# Patient Record
Sex: MALE | Race: WHITE | Employment: OTHER | URBAN - METROPOLITAN AREA
[De-identification: names, ages, dates, MRNs, and addresses within clinical notes are randomized per-mention and may not be internally consistent; named-entity substitution may affect disease eponyms.]

---

## 2019-04-19 ENCOUNTER — APPOINTMENT (OUTPATIENT)
Dept: GENERAL RADIOLOGY | Age: 59
End: 2019-04-19
Attending: PHYSICIAN ASSISTANT
Payer: SELF-PAY

## 2019-04-19 ENCOUNTER — HOSPITAL ENCOUNTER (EMERGENCY)
Age: 59
Discharge: HOME | End: 2019-04-19
Attending: EMERGENCY MEDICINE | Admitting: EMERGENCY MEDICINE
Payer: SELF-PAY

## 2019-04-19 VITALS
BODY MASS INDEX: 29.05 KG/M2 | WEIGHT: 180 LBS | SYSTOLIC BLOOD PRESSURE: 131 MMHG | HEART RATE: 55 BPM | RESPIRATION RATE: 17 BRPM | DIASTOLIC BLOOD PRESSURE: 87 MMHG | TEMPERATURE: 97.8 F | OXYGEN SATURATION: 96 %

## 2019-04-19 DIAGNOSIS — J18.9 COMMUNITY ACQUIRED PNEUMONIA OF RIGHT MIDDLE LOBE OF LUNG: Primary | ICD-10-CM

## 2019-04-19 DIAGNOSIS — E86.0 DEHYDRATION: ICD-10-CM

## 2019-04-19 LAB
ANION GAP BLD CALC-SCNC: 19 MMOL/L (ref 10–20)
BUN BLD-MCNC: 15 MG/DL (ref 7–18)
CA-I BLD-MCNC: 1.1 MMOL/L (ref 1.12–1.32)
CHLORIDE BLD-SCNC: 103 MMOL/L (ref 100–108)
CO2 BLD-SCNC: 25 MMOL/L (ref 19–24)
CREAT UR-MCNC: 1.4 MG/DL (ref 0.6–1.3)
GLUCOSE BLD STRIP.AUTO-MCNC: 123 MG/DL (ref 74–106)
HCT VFR BLD CALC: 49 % (ref 36–49)
HGB BLD-MCNC: 16.7 G/DL (ref 12–16)
POTASSIUM BLD-SCNC: 3.8 MMOL/L (ref 3.5–5.5)
SODIUM BLD-SCNC: 142 MMOL/L (ref 136–145)

## 2019-04-19 PROCEDURE — 80047 BASIC METABLC PNL IONIZED CA: CPT

## 2019-04-19 PROCEDURE — 71046 X-RAY EXAM CHEST 2 VIEWS: CPT

## 2019-04-19 PROCEDURE — 99284 EMERGENCY DEPT VISIT MOD MDM: CPT

## 2019-04-19 RX ORDER — AZITHROMYCIN 250 MG/1
TABLET, FILM COATED ORAL
Qty: 6 TAB | Refills: 0 | Status: SHIPPED | OUTPATIENT
Start: 2019-04-19 | End: 2019-04-24

## 2019-04-19 RX ORDER — BENZONATATE 100 MG/1
100 CAPSULE ORAL
Qty: 30 CAP | Refills: 0 | Status: SHIPPED | OUTPATIENT
Start: 2019-04-19 | End: 2019-04-26

## 2019-04-19 NOTE — DISCHARGE INSTRUCTIONS
Patient Education        Pneumonia: Care Instructions  Your Care Instructions    Pneumonia is an infection of the lungs. Most cases are caused by infections from bacteria or viruses. Pneumonia may be mild or very severe. If it is caused by bacteria, you will be treated with antibiotics. It may take a few weeks to a few months to recover fully from pneumonia, depending on how sick you were and whether your overall health is good. Follow-up care is a key part of your treatment and safety. Be sure to make and go to all appointments, and call your doctor if you are having problems. It's also a good idea to know your test results and keep a list of the medicines you take. How can you care for yourself at home? · Take your antibiotics exactly as directed. Do not stop taking the medicine just because you are feeling better. You need to take the full course of antibiotics. · Take your medicines exactly as prescribed. Call your doctor if you think you are having a problem with your medicine. · Get plenty of rest and sleep. You may feel weak and tired for a while, but your energy level will improve with time. · To prevent dehydration, drink plenty of fluids, enough so that your urine is light yellow or clear like water. Choose water and other caffeine-free clear liquids until you feel better. If you have kidney, heart, or liver disease and have to limit fluids, talk with your doctor before you increase the amount of fluids you drink. · Take care of your cough so you can rest. A cough that brings up mucus from your lungs is common with pneumonia. It is one way your body gets rid of the infection. But if coughing keeps you from resting or causes severe fatigue and chest-wall pain, talk to your doctor. He or she may suggest that you take a medicine to reduce the cough. · Use a vaporizer or humidifier to add moisture to your bedroom. Follow the directions for cleaning the machine.   · Do not smoke or allow others to smoke around you. Smoke will make your cough last longer. If you need help quitting, talk to your doctor about stop-smoking programs and medicines. These can increase your chances of quitting for good. · Take an over-the-counter pain medicine, such as acetaminophen (Tylenol), ibuprofen (Advil, Motrin), or naproxen (Aleve). Read and follow all instructions on the label. · Do not take two or more pain medicines at the same time unless the doctor told you to. Many pain medicines have acetaminophen, which is Tylenol. Too much acetaminophen (Tylenol) can be harmful. · If you were given a spirometer to measure how well your lungs are working, use it as instructed. This can help your doctor tell how your recovery is going. · To prevent pneumonia in the future, talk to your doctor about getting a flu vaccine (once a year) and a pneumococcal vaccine (one time only for most people). When should you call for help? Call 911 anytime you think you may need emergency care. For example, call if:    · You have severe trouble breathing.    Call your doctor now or seek immediate medical care if:    · You cough up dark brown or bloody mucus (sputum).     · You have new or worse trouble breathing.     · You are dizzy or lightheaded, or you feel like you may faint.    Watch closely for changes in your health, and be sure to contact your doctor if:    · You have a new or higher fever.     · You are coughing more deeply or more often.     · You are not getting better after 2 days (48 hours).     · You do not get better as expected. Where can you learn more? Go to http://anthony-karthik.info/. Enter [id number] in the search box to learn more about \"Pneumonia: Care Instructions. \"  Current as of: September 5, 2018  Content Version: 11.9  © 5295-5353 [employer]. Care instructions adapted under license by [employer] (which disclaims liability or warranty for this information).  If you have questions about a medical condition or this instruction, always ask your healthcare professional. [address] any warranty or liability for your use of this information. Patient Education        Oral Rehydration: Care Instructions  Your Care Instructions    Dehydration occurs when your body loses too much water. This can happen if you do not drink enough fluids or lose a lot of fluid due to diarrhea, vomiting, or sweating. Being dehydrated can cause health problems and can even be life-threatening. To replace lost fluids, you need to drink liquid that contains special chemicals called electrolytes. Electrolytes keep your body working well. Plain water does not have electrolytes. You also need to rest to prevent more fluid loss. Replacing water and electrolytes (oral rehydration) completely takes about 36 hours. But you should feel better within a few hours. Follow-up care is a key part of your treatment and safety. Be sure to make and go to all appointments, and call your doctor if you are having problems. It's also a good idea to know your test results and keep a list of the medicines you take. How can you care for yourself at home? · Take frequent sips of a drink such as Gatorade, Powerade, or other rehydration drinks that your doctor suggests. These replace both fluid and important chemicals (electrolytes) you need for balance in your blood. · Drink 2 quarts of cool liquid over 2 to 4 hours. You should have at least 10 glasses of liquid a day to replace lost fluid. If you have kidney, heart, or liver disease and have to limit fluids, talk with your doctor before you increase the amount of fluids you drink. · Make your own drink. Measure everything carefully. The drink may not work well or may even be harmful if the amounts are off. ? 1 quart water  ? ½ teaspoon salt  ? 6 teaspoons sugar  · Do not drink liquid with caffeine, such as coffee and osito. · Do not drink any alcohol.  It can make you dehydrated. · Drink plenty of fluids, enough so that your urine is light yellow or clear like water. If you have kidney, heart, or liver disease and have to limit fluids, talk with your doctor before you increase the amount of fluids you drink. When should you call for help? Call 911 anytime you think you may need emergency care. For example, call if:    · You have signs of severe dehydration, such as:  ? You are confused or unable to stay awake.  ? You passed out (lost consciousness).    Call your doctor now or seek immediate medical care if:    · You still have signs of dehydration. You have sunken eyes and a dry mouth, and you pass only a little dark urine.     · You are dizzy or lightheaded, or you feel like you may faint.     · You are not able to keep down fluids.    Watch closely for changes in your health, and be sure to contact your doctor if:    · You do not get better as expected. Where can you learn more? Go to http://anthony-karthik.info/. Enter I040 in the search box to learn more about \"Oral Rehydration: Care Instructions. \"  Current as of: September 23, 2018  Content Version: 11.9  © 1666-5300 [employer]. Care instructions adapted under license by [employer] (which disclaims liability or warranty for this information). If you have questions about a medical condition or this instruction, always ask your healthcare professional. [address] any warranty or liability for your use of this information.

## 2019-04-19 NOTE — ED TRIAGE NOTES
Pt ambulatory into triage for c/o generalized body aches, diarrhea (x4 episodes per day), and productive cough since last Friday. Pt has been taking sudafed and cough medicine without relief. Denies abd pain, denies N/V, denies urinary symptoms.

## 2019-04-20 NOTE — ED PROVIDER NOTES
[facility] EMERGENCY DEPT 
 
 
8:49 PM 
 
Date: 4/19/2019 Patient Name: Carolyne Logan History of Presenting Illness Chief Complaint Patient presents with  Generalized Body Aches  Diarrhea  Cough 63 y.o. male with past medical history of hypertension presents the ED for complaints of generalized body aches, diarrhea, productive cough for the past 4 days. Patient states his diarrhea is brown, nonbloody. States he is having about 4 episodes of diarrhea per day. Notes that his cough is productive of green sputum. Also notes having diffuse body aches and nasal congestion. States he is not having any abdominal pain or vomiting, able to keep down food and fluids. Denies any fever, chills, shortness of breath, chest pain, other symptoms at this time. Nursing notes regarding the HPI and triage nursing notes were reviewed. Prior medical records were reviewed. Current Outpatient Medications Medication Sig Dispense Refill  azithromycin (ZITHROMAX Z-MINA) 250 mg tablet Take 2 tabs today and 1 tab each day thereafter. 6 Tab 0  
 benzonatate (TESSALON PERLES) 100 mg capsule Take 1 Cap by mouth three (3) times daily as needed for Cough for up to 7 days. 30 Cap 0  
 sodium chloride (NASAL MIST) 0.9 % spra 1 Spray by Nasal route as needed (congestion). 126 mL 0  
 atenolol (TENORMIN) 25 mg tablet Take 1 Tab by mouth daily. 30 Tab 0 Past History Past Medical History: 
Past Medical History:  
Diagnosis Date  
 HTN (hypertension) Past Surgical History: 
History reviewed. No pertinent surgical history. Family History: 
History reviewed. No pertinent family history. Social History: 
Social History Tobacco Use  Smoking status: Current Every Day Smoker  Smokeless tobacco: Never Used Substance Use Topics  Alcohol use: Yes  Drug use: Never Allergies: 
No Known Allergies Patient's primary care provider (as noted in EPIC):  [facility], Not On File Review of Systems Constitutional:  Denies malaise, fever, chills. Head:  Denies injury. ENMT:  + nasal congestion. Cardiac:  Denies chest pain or palpitations. Respiratory: + productive cough. Denies wheezing, difficulty breathing, shortness of breath. GI/ABD: + diarrhea. Denies injury, pain, distention, nausea, vomiting. Neuro:  Denies headache, LOC, dizziness, neurologic symptoms/deficits/paresthesias. Skin: Denies injury, rash, itching or skin changes. All other systems negative as reviewed. Visit Vitals /87 (BP 1 Location: Right arm, BP Patient Position: At rest) Pulse (!) 55 Temp 97.8 °F (36.6 °C) Resp 17 Wt 81.6 kg (180 lb) SpO2 96% BMI 29.05 kg/m² PHYSICAL EXAM: 
 
CONSTITUTIONAL:  Alert, in no apparent distress;  well developed;  well nourished. HEAD:  Normocephalic, atraumatic. EYES:  EOMI. Non-icteric sclera. Normal conjunctiva. ENTM:  Nose: Nasal turbinates erythematous and edematous, no rhinorrhea. Throat: Mild erythema, without edema or exudate, mucous membranes moist.  Uvula midline, airway patent. NECK:  Supple RESPIRATORY:  Chest clear, equal breath sounds, good air movement. Without wheezes, rhonchi, rales. CARDIOVASCULAR:  Regular rate and rhythm. No murmurs, rubs, or gallops. UPPER EXT:  Normal inspection. NEURO:  Moves all four extremities, and grossly normal motor exam. Patient is awake and alert, and answers questions appropriatelly. SKIN:  No rashes;  Normal for age. PSYCH:  Alert and normal affect. DIFFERENTIAL DIAGNOSES/ MEDICAL DECISION MAKING: 
Cough etiologies include viral upper respiratory infection, acute bronchitis, influenza/ flu, pneumonia, new onset asthma, other etiologies versus a combination of the above (ex. uri on top of pneumonia). CXR: Possible developing RML infiltrate Recent Results (from the past 12 hour(s)) POC CHEM8  
 Collection Time: 04/19/19  7:41 PM  
Result Value Ref Range CO2, POC 25 (H) 19 - 24 MMOL/L Glucose,  (H) 74 - 106 MG/DL  
 BUN, POC 15 7 - 18 MG/DL Creatinine, POC 1.4 (H) 0.6 - 1.3 MG/DL  
 GFRAA, POC >60 >60 ml/min/1.73m2 GFRNA, POC 52 (L) >60 ml/min/1.73m2 Sodium,  136 - 145 MMOL/L Potassium, POC 3.8 3.5 - 5.5 MMOL/L Calcium, ionized (POC) 1.10 (L) 1.12 - 1.32 mmol/L Chloride,  100 - 108 MMOL/L Anion gap, POC 19 10 - 20 Hematocrit, POC 49 36 - 49 % Hemoglobin, POC 16.7 (H) 12 - 16 G/DL IMPRESSION AND MEDICAL DECISION MAKING: 
Based upon the patient's presentation with noted HPI and PE, along with the work up done in the emergency department, I believe that the patient is having a viral illness with pneumonia. His hematocrit is slightly increased, may be from dehydration. Patient given strict instructions on oral rehydration at home. Rx provided for azithromycin, Tessalon Perles, and nasal mist.  Patient may follow-up with primary care doctor. Diagnosis: 1. Community acquired pneumonia of right middle lobe of lung ([address] 2. Dehydration Disposition: Discharge Follow-up Information Follow up With Specialties Details Why Contact Info Aaron Montoya DO Family Practice, [facility] Practice In 3 days  [id number] [address] [id number] 
[phone] [facility] EMERGENCY DEPT Emergency Medicine  If symptoms worsen [address] 
[phone] Discharge Medication List as of 4/19/2019  7:58 PM  
  
START taking these medications Details  
azithromycin (ZITHROMAX Z-MINA) 250 mg tablet Take 2 tabs today and 1 tab each day thereafter., Print, Disp-6 Tab, R-0  
  
benzonatate (TESSALON PERLES) 100 mg capsule Take 1 Cap by mouth three (3) times daily as needed for Cough for up to 7 days. , Print, Disp-30 Cap, R-0  
  
 sodium chloride (NASAL MIST) 0.9 % spra 1 Spray by Nasal route as needed (congestion). , Print, Disp-126 mL, R-0  
  
  
CONTINUE these medications which have NOT CHANGED Details  
atenolol (TENORMIN) 25 mg tablet Take 1 Tab by mouth daily. , Print, Disp-30 Tab, R-0 FRANCISCA Najera

## 2019-04-20 NOTE — ED NOTES
Patient stated understanding of discharge instructions. Patient was ambulatory upon discharge. Patient received three prescriptions. ,Patient armband removed and shredded

## 2019-07-28 ENCOUNTER — APPOINTMENT (OUTPATIENT)
Dept: GENERAL RADIOLOGY | Age: 59
End: 2019-07-28
Attending: PHYSICIAN ASSISTANT
Payer: SELF-PAY

## 2019-07-28 ENCOUNTER — HOSPITAL ENCOUNTER (EMERGENCY)
Age: 59
Discharge: HOME | End: 2019-07-28
Attending: EMERGENCY MEDICINE | Admitting: EMERGENCY MEDICINE
Payer: SELF-PAY

## 2019-07-28 VITALS
BODY MASS INDEX: 28.93 KG/M2 | TEMPERATURE: 98 F | DIASTOLIC BLOOD PRESSURE: 95 MMHG | OXYGEN SATURATION: 99 % | RESPIRATION RATE: 12 BRPM | HEART RATE: 65 BPM | HEIGHT: 66 IN | SYSTOLIC BLOOD PRESSURE: 152 MMHG | WEIGHT: 180 LBS

## 2019-07-28 DIAGNOSIS — R60.0 EDEMA OF FOOT: ICD-10-CM

## 2019-07-28 DIAGNOSIS — M25.571 ACUTE RIGHT ANKLE PAIN: Primary | ICD-10-CM

## 2019-07-28 LAB — D DIMER PPP FEU-MCNC: 0.37 UG/ML(FEU)

## 2019-07-28 PROCEDURE — 77030013175 HC SHOE PSTOP DJOR -A

## 2019-07-28 PROCEDURE — 73630 X-RAY EXAM OF FOOT: CPT

## 2019-07-28 PROCEDURE — 99283 EMERGENCY DEPT VISIT LOW MDM: CPT

## 2019-07-28 PROCEDURE — 85379 FIBRIN DEGRADATION QUANT: CPT

## 2019-07-28 RX ORDER — HYDROCODONE BITARTRATE AND ACETAMINOPHEN 5; 325 MG/1; MG/1
1 TABLET ORAL
Qty: 8 TAB | Refills: 0 | Status: SHIPPED | OUTPATIENT
Start: 2019-07-28 | End: 2019-08-02

## 2019-07-28 RX ORDER — NAPROXEN 500 MG/1
500 TABLET ORAL 2 TIMES DAILY WITH MEALS
Qty: 20 TAB | Refills: 0 | Status: SHIPPED | OUTPATIENT
Start: 2019-07-28 | End: 2019-07-28

## 2019-07-28 RX ORDER — INDOMETHACIN 25 MG/1
50 CAPSULE ORAL 3 TIMES DAILY
Qty: 60 CAP | Refills: 0 | Status: SHIPPED | OUTPATIENT
Start: 2019-07-28

## 2019-07-28 NOTE — ED PROVIDER NOTES
EMERGENCY DEPARTMENT HISTORY AND PHYSICAL EXAM    6:04 PM      Date: 7/28/2019  Patient Name: Katie Carias    History of Presenting Illness     Chief Complaint   Patient presents with    Foot Pain       History Provided By: Patient    Additional History (Context): Katie Carias is a 63 y.o. male with hypertension who presents with c/o R foot/ankle pain and edema x 2 days. Denies fever/chills, discoloration of skin, injury or trauma, numbness or tingling, calf pain or swelling. Notes history of previous ankle fracture \"years ago\". Notes he works on his feet all day. Notes he has been taking over-the-counter medication with minimal relief. Denies hx of arthritis, gout, DVT/PE, recent surgery/travel. PCP: Unknown, Provider    Current Outpatient Medications   Medication Sig Dispense Refill    indomethacin (INDOCIN) 25 mg capsule Take 2 Caps by mouth three (3) times daily. With food 60 Cap 0    HYDROcodone-acetaminophen (NORCO) 5-325 mg per tablet Take 1 Tab by mouth every six (6) hours as needed for Pain for up to 5 days. Max Daily Amount: 4 Tabs. 8 Tab 0    sodium chloride (NASAL MIST) 0.9 % spra 1 Spray by Nasal route as needed (congestion). 126 mL 0    atenolol (TENORMIN) 25 mg tablet Take 1 Tab by mouth daily. 30 Tab 0       Past History     Past Medical History:  Past Medical History:   Diagnosis Date    HTN (hypertension)        Past Surgical History:  History reviewed. No pertinent surgical history. Family History:  History reviewed. No pertinent family history. Social History:  Social History     Tobacco Use    Smoking status: Current Every Day Smoker    Smokeless tobacco: Never Used   Substance Use Topics    Alcohol use: Yes    Drug use: Never       Allergies:  No Known Allergies      Review of Systems       Review of Systems   Constitutional: Negative for chills and fever. Respiratory: Negative for shortness of breath. Cardiovascular: Negative for chest pain. Gastrointestinal: Negative for abdominal pain, nausea and vomiting. Musculoskeletal: Positive for joint swelling and myalgias. Skin: Negative for rash. Neurological: Negative for weakness. All other systems reviewed and are negative. Physical Exam     Visit Vitals  BP (!) 152/95 (BP 1 Location: Right arm, BP Patient Position: Sitting)   Pulse 65   Temp 98 °F (36.7 °C)   Resp 12   Ht 5' 6\" (1.676 m)   Wt 81.6 kg (180 lb)   SpO2 99%   BMI 29.05 kg/m²         Physical Exam   Constitutional: He appears well-developed and well-nourished. No distress. HENT:   Head: Normocephalic and atraumatic. Neck: Normal range of motion. Neck supple. Cardiovascular: Normal rate, regular rhythm, normal heart sounds and intact distal pulses. Exam reveals no gallop and no friction rub. No murmur heard. Pulmonary/Chest: Effort normal and breath sounds normal. No respiratory distress. He has no wheezes. He has no rales. Musculoskeletal:        Right ankle: He exhibits decreased range of motion (pain with PROM of ankle joint) and swelling. Tenderness. Lateral malleolus tenderness found. No medial malleolus tenderness found. Achilles tendon normal.        Right foot: There is tenderness and swelling (mild diffuse edema of foot with minimal extension into ankle). There is normal range of motion, normal capillary refill, no crepitus and no deformity. No calf tenderness or edema, no erythema/ warmth, dorsalis pedis 2+    Neurological: He is alert. Skin: Skin is warm. No rash noted. He is not diaphoretic. Nursing note and vitals reviewed. Diagnostic Study Results     Labs -  No results found for this or any previous visit (from the past 12 hour(s)). Radiologic Studies -   XR FOOT RT MIN 3 V   Final Result   IMPRESSION:      Mild and diffuse right foot soft tissue swelling without evidence of fracture or   acute malalignment.             Medical Decision Making   I am the first provider for this patient. I reviewed the vital signs, available nursing notes, past medical history, past surgical history, family history and social history. Vital Signs-Reviewed the patient's vital signs. Records Reviewed: Nursing Notes and Old Medical Records (Time of Review: 6:04 PM)    ED Course: Progress Notes, Reevaluation, and Consults:  9:50PM:  Reviewed results with patient and family. Discussed need for close outpatient follow-up this week for reassessment. Discussed strict return precautions, including fever, increased swelling, calf pain/swelling, or any other medical concerns. Provider Notes (Medical Decision Making): 31-year-old male who presents due to right ankle pain/foot pain associated with edema. Afebrile, nontoxic-appearing, looks well. History of previous ankle fracture. No acute trauma to ankle. No calf edema or tenderness to palpation. X-ray without acute process. Extremity neurovascularly intact. No evidence of DVT, cellulitis, septic joint, fracture. Possible gout, although atypical initial presentation. Will provide ace wrap, post op shoe, crutches. Will refer to orthopedic for further assessment. Will d/c with NSAIDs and pain control. Stable for d/c with close outpatient follow-up. Diagnosis     Clinical Impression:   1. Acute right ankle pain    2. Edema of foot        Disposition: home     Follow-up Information     Follow up With Specialties Details Why [address] [facility] EMERGENCY DEPT Emergency Medicine  If symptoms worsen [address] [place] [id number]  [address] [facility]  Schedule an appointment as soon as possible for a visit  [address]  [address]  [phone]           Discharge Medication List as of 7/28/2019  7:50 PM      START taking these medications    Details   indomethacin (INDOCIN) 25 mg capsule Take 2 Caps by mouth three (3) times daily.  With food, Print, Disp-60 Cap, R-0 HYDROcodone-acetaminophen (NORCO) 5-325 mg per tablet Take 1 Tab by mouth every six (6) hours as needed for Pain for up to 5 days. Max Daily Amount: 4 Tabs., Print, Disp-8 Tab, R-0         CONTINUE these medications which have NOT CHANGED    Details   sodium chloride (NASAL MIST) 0.9 % spra 1 Spray by Nasal route as needed (congestion). , Print, Disp-126 mL, R-0      atenolol (TENORMIN) 25 mg tablet Take 1 Tab by mouth daily. , Print, Disp-30 Tab, R-0             Dictation disclaimer:  Please note that this dictation was completed with [employer], the computer voice recognition software. Quite often unanticipated grammatical, syntax, homophones, and other interpretive errors are inadvertently transcribed by the computer software. Please disregard these errors. Please excuse any errors that have escaped final proofreading.

## 2019-07-28 NOTE — DISCHARGE INSTRUCTIONS
Patient Education     Take medication as prescribed. Follow-up with the orthopedic physician within 2 days for reassessment. Bring the results from this visit with you for their review. Return to the ED immediately for any new, worsening, or persistent symptoms, including fever, redness, or any other medical concerns. Leg and Ankle Edema: Care Instructions  Your Care Instructions  Swelling in the legs, ankles, and feet is called edema. It is common after you sit or stand for a while. Long plane flights or car rides often cause swelling in the legs and feet. You may also have swelling if you have to stand for long periods of time at your job. Problems with the veins in the legs (varicose veins) and changes in hormones can also cause swelling. Sometimes the swelling in the ankles and feet is caused by a more serious problem, such as heart failure, infection, blood clots, or liver or kidney disease. Follow-up care is a key part of your treatment and safety. Be sure to make and go to all appointments, and call your doctor if you are having problems. It's also a good idea to know your test results and keep a list of the medicines you take. How can you care for yourself at home? · If your doctor gave you medicine, take it as prescribed. Call your doctor if you think you are having a problem with your medicine. · Whenever you are resting, raise your legs up. Try to keep the swollen area higher than the level of your heart. · Take breaks from standing or sitting in one position. ? Walk around to increase the blood flow in your lower legs. ? Move your feet and ankles often while you stand, or tighten and relax your leg muscles. · Wear support stockings. Put them on in the morning, before swelling gets worse. · Eat a balanced diet. Lose weight if you need to. · Limit the amount of salt (sodium) in your diet. Salt holds fluid in the body and may increase swelling. When should you call for help?   Call 911 anytime you think you may need emergency care. For example, call if:    · You have symptoms of a blood clot in your lung (called a pulmonary embolism). These may include:  ? Sudden chest pain. ? Trouble breathing. ? Coughing up blood.    Call your doctor now or seek immediate medical care if:    · You have signs of a blood clot, such as:  ? Pain in your calf, back of the knee, thigh, or groin. ? Redness and swelling in your leg or groin.     · You have symptoms of infection, such as:  ? Increased pain, swelling, warmth, or redness. ? Red streaks or pus. ? A fever.    Watch closely for changes in your health, and be sure to contact your doctor if:    · Your swelling is getting worse.     · You have new or worsening pain in your legs.     · You do not get better as expected. Where can you learn more? Go to http://anthony-karthik.info/. Enter A705 in the search box to learn more about \"Leg and Ankle Edema: Care Instructions. \"  Current as of: September 23, 2018  Content Version: 12.1  © 5882-3900 [employer]. Care instructions adapted under license by [employer] (which disclaims liability or warranty for this information). If you have questions about a medical condition or this instruction, always ask your healthcare professional. [place] [address] any warranty or liability for your use of this information.

## 2019-07-29 NOTE — ED NOTES
Ace wrap, post op shoes, and crutches completed by Jolyn Krabbe, EDT. I have reviewed discharge instructions with the patient. The patient verbalized understanding. Discharge medications reviewed with patient and appropriate educational materials and side effects teaching were provided. Patient in stable condition at discharge. Patient armband removed and given to patient to take home. Patient was informed of the privacy risks if armband lost or stolen. Patient signed paper discharge instructions.

## 2020-03-29 ENCOUNTER — HOSPITAL ENCOUNTER (EMERGENCY)
Age: 60
Discharge: HOME | End: 2020-03-29
Attending: EMERGENCY MEDICINE
Payer: SELF-PAY

## 2020-03-29 ENCOUNTER — APPOINTMENT (OUTPATIENT)
Dept: GENERAL RADIOLOGY | Age: 60
End: 2020-03-29
Attending: PHYSICIAN ASSISTANT
Payer: SELF-PAY

## 2020-03-29 VITALS
DIASTOLIC BLOOD PRESSURE: 88 MMHG | TEMPERATURE: 97.4 F | BODY MASS INDEX: 29.73 KG/M2 | OXYGEN SATURATION: 99 % | SYSTOLIC BLOOD PRESSURE: 144 MMHG | HEIGHT: 66 IN | WEIGHT: 185 LBS | RESPIRATION RATE: 14 BRPM | HEART RATE: 60 BPM

## 2020-03-29 DIAGNOSIS — S61.212A LACERATION OF RIGHT MIDDLE FINGER WITHOUT FOREIGN BODY WITHOUT DAMAGE TO NAIL, INITIAL ENCOUNTER: Primary | ICD-10-CM

## 2020-03-29 PROCEDURE — 73130 X-RAY EXAM OF HAND: CPT

## 2020-03-29 PROCEDURE — 75810000293 HC SIMP/SUPERF WND  RPR

## 2020-03-29 PROCEDURE — 99283 EMERGENCY DEPT VISIT LOW MDM: CPT

## 2020-03-29 PROCEDURE — 90715 TDAP VACCINE 7 YRS/> IM: CPT | Performed by: PHYSICIAN ASSISTANT

## 2020-03-29 PROCEDURE — 74011250637 HC RX REV CODE- 250/637: Performed by: PHYSICIAN ASSISTANT

## 2020-03-29 PROCEDURE — 74011000250 HC RX REV CODE- 250: Performed by: PHYSICIAN ASSISTANT

## 2020-03-29 PROCEDURE — 74011250636 HC RX REV CODE- 250/636: Performed by: PHYSICIAN ASSISTANT

## 2020-03-29 PROCEDURE — 90471 IMMUNIZATION ADMIN: CPT

## 2020-03-29 RX ORDER — ACETAMINOPHEN 500 MG
1000 TABLET ORAL
Status: COMPLETED | OUTPATIENT
Start: 2020-03-29 | End: 2020-03-29

## 2020-03-29 RX ORDER — BACITRACIN 500 UNIT/G
1 PACKET (EA) TOPICAL
Status: COMPLETED | OUTPATIENT
Start: 2020-03-29 | End: 2020-03-29

## 2020-03-29 RX ORDER — NAPROXEN 500 MG/1
500 TABLET ORAL 2 TIMES DAILY WITH MEALS
Qty: 20 TAB | Refills: 0 | Status: SHIPPED | OUTPATIENT
Start: 2020-03-29 | End: 2020-04-08

## 2020-03-29 RX ORDER — CEPHALEXIN 500 MG/1
500 CAPSULE ORAL 4 TIMES DAILY
Qty: 28 CAP | Refills: 0 | Status: SHIPPED | OUTPATIENT
Start: 2020-03-29 | End: 2020-04-05

## 2020-03-29 RX ORDER — IBUPROFEN 400 MG/1
800 TABLET ORAL
Status: COMPLETED | OUTPATIENT
Start: 2020-03-29 | End: 2020-03-29

## 2020-03-29 RX ORDER — ACETAMINOPHEN 325 MG/1
650 TABLET ORAL
Qty: 20 TAB | Refills: 0 | Status: SHIPPED | OUTPATIENT
Start: 2020-03-29

## 2020-03-29 RX ADMIN — IBUPROFEN 800 MG: 400 TABLET ORAL at 13:21

## 2020-03-29 RX ADMIN — BACITRACIN ZINC 1 PACKET: 500 OINTMENT TOPICAL at 15:19

## 2020-03-29 RX ADMIN — ACETAMINOPHEN 1000 MG: 500 TABLET, FILM COATED ORAL at 13:21

## 2020-03-29 RX ADMIN — TETANUS TOXOID, REDUCED DIPHTHERIA TOXOID AND ACELLULAR PERTUSSIS VACCINE, ADSORBED 0.5 ML: 5; 2.5; 8; 8; 2.5 SUSPENSION INTRAMUSCULAR at 13:21

## 2020-03-29 NOTE — DISCHARGE INSTRUCTIONS
Patient Education        Cuts: Care Instructions  Your Care Instructions  A cut can happen anywhere on your body. Stitches, staples, skin adhesives, or pieces of tape called Steri-Strips are sometimes used to keep the edges of a cut together and help it heal. Steri-Strips can be used by themselves or with stitches or staples. Sometimes cuts are left open. If the cut went deep and through the skin, the doctor may have closed the cut in two layers. A deeper layer of stitches brings the deep part of the cut together. These stitches will dissolve and don't need to be removed. The upper layer closure, which could be stitches, staples, Steri-Strips, or adhesive, is what you see on the cut. A cut is often covered by a bandage. The doctor has checked you carefully, but problems can develop later. If you notice any problems or new symptoms, get medical treatment right away. Follow-up care is a key part of your treatment and safety. Be sure to make and go to all appointments, and call your doctor if you are having problems. It's also a good idea to know your test results and keep a list of the medicines you take. How can you care for yourself at home? If a cut is open or closed  · Prop up the sore area on a pillow anytime you sit or lie down during the next 3 days. Try to keep it above the level of your heart. This will help reduce swelling. · Keep the cut dry for the first 24 to 48 hours. After this, you can shower if your doctor okays it. Pat the cut dry. · Don't soak the cut, such as in a bathtub. Your doctor will tell you when it's safe to get the cut wet. · After the first 24 to 48 hours, clean the cut with soap and water 2 times a day unless your doctor gives you different instructions. ? Don't use hydrogen peroxide or alcohol, which can slow healing. ? You may cover the cut with a thin layer of petroleum jelly and a nonstick bandage.   ? If the doctor put a bandage over the cut, put on a new bandage after cleaning the cut or if the bandage gets wet or dirty. · Avoid any activity that could cause your cut to reopen. · Be safe with medicines. Read and follow all instructions on the label. ? If the doctor gave you a prescription medicine for pain, take it as prescribed. ? If you are not taking a prescription pain medicine, ask your doctor if you can take an over-the-counter medicine. If the cut is closed with stitches, staples, or Steri-Strips  · Follow the above instructions for open or closed cuts. · Do not remove the stitches or staples on your own. Your doctor will tell you when to come back to have the stitches or staples removed. · Leave Steri-Strips on until they fall off. If the cut is closed with a skin adhesive  · Follow the above instructions for open or closed cuts. · Leave the skin adhesive on your skin until it falls off on its own. This may take 5 to 10 days. · Do not scratch, rub, or pick at the adhesive. · Do not put the sticky part of a bandage directly on the adhesive. · Do not put any kind of ointment, cream, or lotion over the area. This can make the adhesive fall off too soon. Do not use hydrogen peroxide or alcohol, which can slow healing. When should you call for help? Call your doctor now or seek immediate medical care if:    · You have new pain, or your pain gets worse.     · The skin near the cut is cold or pale or changes color.     · You have tingling, weakness, or numbness near the cut.     · The cut starts to bleed, and blood soaks through the bandage. Oozing small amounts of blood is normal.     · You have trouble moving the area near the cut.     · You have symptoms of infection, such as:  ? Increased pain, swelling, warmth, or redness around the cut.  ? Red streaks leading from the cut.  ? Pus draining from the cut.  ? A fever.    Watch closely for changes in your health, and be sure to contact your doctor if:    · The cut reopens.     · You do not get better as expected. Where can you learn more? Go to http://anthony-karthik.info/  Enter M735 in the search box to learn more about \"Cuts: Care Instructions. \"  Current as of: June 26, 2019Content Version: 12.4  © 8873-7305 Healthwise, Incorporated. Care instructions adapted under license by [employer] (which disclaims liability or warranty for this information). If you have questions about a medical condition or this instruction, always ask your healthcare professional. [place] [address] any warranty or liability for your use of this information. Patient Education        Cuts on the Hand Closed With Stitches: Care Instructions  Your Care Instructions    A cut on your hand can be on your fingers, your thumb, or the front or back of your hand. Sometimes a cut can injure the tendons, blood vessels, or nerves of your hand. The doctor used stitches to close the cut. Using stitches also helps the cut heal and reduces scarring. The doctor may have given you a splint to help prevent you from moving your hand, fingers, or thumb. If the cut went deep and through the skin, the doctor put in two layers of stitches. The deeper layer brings the deep part of the cut together. These stitches will dissolve and don't need to be removed. The stitches in the upper layer are the ones you see on the cut. You will probably have a bandage. You will need to have the stitches removed, usually in 7 to 14 days. The doctor may suggest that you see a hand specialist if the cut is very deep or if you have trouble moving your fingers or have less feeling in your hand. The doctor has checked you carefully, but problems can develop later. If you notice any problems or new symptoms, get medical treatment right away. Follow-up care is a key part of your treatment and safety. Be sure to make and go to all appointments, and call your doctor if you are having problems.  It's also a good idea to know your test results and keep a list of the medicines you take. How can you care for yourself at home? · Keep the cut dry for the first 24 to 48 hours. After this, you can shower if your doctor okays it. Pat the cut dry. · Don't soak the cut, such as in a bathtub. Your doctor will tell you when it's safe to get the cut wet. · If your doctor told you how to care for your cut, follow your doctor's instructions. If you did not get instructions, follow this general advice:  ? After the first 24 to 48 hours, wash around the cut with clean water 2 times a day. Don't use hydrogen peroxide or alcohol, which can slow healing. ? You may cover the cut with a thin layer of petroleum jelly, such as Vaseline, and a nonstick bandage. ? Apply more petroleum jelly and replace the bandage as needed. · Prop up the sore hand on a pillow anytime you sit or lie down during the next 3 days. Try to keep it above the level of your heart. This will help reduce swelling. · Avoid any activity that could cause your cut to reopen. · Do not remove the stitches on your own. Your doctor will tell you when to come back to have the stitches removed. · Be safe with medicines. Take pain medicines exactly as directed. ? If the doctor gave you a prescription medicine for pain, take it as prescribed. ? If you are not taking a prescription pain medicine, ask your doctor if you can take an over-the-counter medicine. When should you call for help? Call your doctor now or seek immediate medical care if:    · You have new pain, or your pain gets worse.     · The skin near the cut is cold or pale or changes color.     · You have tingling, weakness, or numbness near the cut.     · The cut starts to bleed, and blood soaks through the bandage. Oozing small amounts of blood is normal.     · You have trouble moving the area of the hand near the cut.     · You have symptoms of infection, such as:  ? Increased pain, swelling, warmth, or redness around the cut.  ?  Red streaks leading from the cut.  ? Pus draining from the cut.  ? A fever.    Watch closely for changes in your health, and be sure to contact your doctor if:    · You do not get better as expected. Where can you learn more? Go to http://anthony-karthik.info/  Enter T250 in the search box to learn more about \"Cuts on the Hand Closed With Stitches: Care Instructions. \"  Current as of: June 26, 2019Content Version: 12.4  © 7720-4387 Healthwise, Incorporated. Care instructions adapted under license by [employer] (which disclaims liability or warranty for this information). If you have questions about a medical condition or this instruction, always ask your healthcare professional. [address] any warranty or liability for your use of this information.

## 2020-03-29 NOTE — ED PROVIDER NOTES
EMERGENCY DEPARTMENT HISTORY AND PHYSICAL EXAM    12:42 PM      Date: 3/29/2020  Patient Name: Kristy Rodriguez    History of Presenting Illness     Chief Complaint   Patient presents with    Laceration         History Provided By: Patient    Additional History (Context): Kristy Rodriguez is a 63 y.o. male with hypertension who presents with complaints of a laceration to his left middle finger after getting it caught in a saw. States he is unsure of his tetanus status. PCP: Other, MD Rick    Current Outpatient Medications   Medication Sig Dispense Refill    cephALEXin (Keflex) 500 mg capsule Take 1 Cap by mouth four (4) times daily for 7 days. 28 Cap 0    naproxen (Naprosyn) 500 mg tablet Take 1 Tab by mouth two (2) times daily (with meals) for 10 days. 20 Tab 0    acetaminophen (TYLENOL) 325 mg tablet Take 2 Tabs by mouth every six (6) hours as needed for Pain. 20 Tab 0    indomethacin (INDOCIN) 25 mg capsule Take 2 Caps by mouth three (3) times daily. With food 60 Cap 0    sodium chloride (NASAL MIST) 0.9 % spra 1 Spray by Nasal route as needed (congestion). 126 mL 0    atenolol (TENORMIN) 25 mg tablet Take 1 Tab by mouth daily. 30 Tab 0       Past History     Past Medical History:  Past Medical History:   Diagnosis Date    HTN (hypertension)        Past Surgical History:  History reviewed. No pertinent surgical history. Family History:  History reviewed. No pertinent family history. Social History:  Social History     Tobacco Use    Smoking status: Current Every Day Smoker    Smokeless tobacco: Never Used   Substance Use Topics    Alcohol use: Yes    Drug use: Never       Allergies:  No Known Allergies      Review of Systems       Review of Systems   Constitutional: Negative. HENT: Negative. Respiratory: Negative. Cardiovascular: Negative. Gastrointestinal: Negative. Genitourinary: Negative. Musculoskeletal: Positive for arthralgias. Skin: Positive for wound. Neurological: Negative. All other systems reviewed and are negative. Physical Exam     Visit Vitals  /88 (BP 1 Location: Right arm)   Pulse 60   Temp 97.4 °F (36.3 °C)   Resp 14   Ht 5' 6\" (1.676 m)   Wt 83.9 kg (185 lb)   SpO2 99%   BMI 29.86 kg/m²         Physical Exam  Vitals signs reviewed. Constitutional:       General: He is not in acute distress. Appearance: Normal appearance. He is not ill-appearing, toxic-appearing or diaphoretic. HENT:      Head: Normocephalic and atraumatic. Right Ear: External ear normal.      Left Ear: External ear normal.      Nose: Nose normal.      Mouth/Throat:      Mouth: Mucous membranes are moist.      Pharynx: Oropharynx is clear. Eyes:      Extraocular Movements: Extraocular movements intact. Neck:      Musculoskeletal: Neck supple. Cardiovascular:      Rate and Rhythm: Normal rate and regular rhythm. Pulses: Normal pulses. Heart sounds: No murmur. No gallop. Pulmonary:      Effort: Pulmonary effort is normal.      Breath sounds: No wheezing, rhonchi or rales. Skin:     General: Skin is warm and dry. Capillary Refill: Capillary refill takes less than 2 seconds. Comments: Stellate wound to distal aspect of right long finger, measuring approximately 4.5 cm in total   Neurological:      General: No focal deficit present. Mental Status: He is alert and oriented to person, place, and time. Cranial Nerves: No cranial nerve deficit. Sensory: No sensory deficit. Motor: No weakness. Diagnostic Study Results     Labs -  No results found for this or any previous visit (from the past 12 hour(s)). Radiologic Studies -   XR HAND LT MIN 3 V   Final Result   IMPRESSION:      Distal left third digit laceration but negative for acute osseous normality or   radiopaque foreign body in the left hand. Medical Decision Making   I am the first provider for this patient.     I reviewed the vital signs, available nursing notes, past medical history, past surgical history, family history and social history. Vital Signs-Reviewed the patient's vital signs. Records Reviewed: Nursing Notes and Old Medical Records (Time of Review: 12:42 PM)    ED Course: Progress Notes, Reevaluation, and Consults:  12:42 PM  Met with patient, reviewed history, performed physical exam. Will check radiographs of the left hand to evaluate for fractures. Will clean wound and update patient's tetanus. Procedures:  Wound Repair  Date/Time: 3/29/2020 3:16 PM  Performed by: PAPreparation: sterile field established  Location details: right long finger  Wound length:2.6 - 7.5 cm (Two lacerations: Distal, ventral aspect of long finger measuring approx 1cm; medial aspect of long finger, approx 2.7cm)  Anesthesia: digital block    Anesthesia:  Local Anesthetic: lidocaine 1% without epinephrine  Anesthetic total: 4 mL  Foreign bodies: no foreign bodies  Irrigation solution: saline  Irrigation method: syringe  Debridement: minimal  Skin closure: 4-0 nylon  Number of sutures: 12 (Distal 1cm laceration 4 sutures, medial 2.7cm laceration 8 sutures.)  Technique: simple and interrupted  Approximation: close  Dressing: antibiotic ointment  Patient tolerance: Patient tolerated the procedure well with no immediate complications  My total time at bedside, performing this procedure was 31-45 minutes. Provider Notes (Medical Decision Making):  63year old male seen in the ED for complaints of laceration to the right long finger. Physical exam as noted above. Radiographs negative for acute fracture. Tetanus updated prior to discharge. Patient's wound closed as outlined above. He will be placed on oral antibiotics. He was advised to follow up with Dr Favian Huff, orthopedic surgeon, for reassessment. Patient advised to follow up with PCP as soon as possible.  Patient advised to return to the ED in 5-7 days for suture removal. Patient was advised to return to the ED immediately if symptoms worsen, or as needed. Diagnosis     Clinical Impression:   1. Laceration of right middle finger without foreign body without damage to nail, initial encounter        Disposition: home     Follow-up Information     Follow up With Specialties Details Why [address]  Call in 1 day For follow up regarding ER visit. [address] [facility] 800 [facility] Call in 1 day For follow up regarding ER visit. [address] [facility]    Alana Shaw MD Orthopedic Surgery, Orthopedic Surgery, Orthopedic Surgery Call in 1 day For follow up regarding ER visit. [place] [address]  [address] 150 [facility] EMERGENCY DEPT Emergency Medicine In 1 week For suture removal. [address] [facility] 51    [facility] EMERGENCY DEPT Emergency Medicine  As needed, Immediately if symptoms worsen. [address]  [phone]           Discharge Medication List as of 3/29/2020  3:19 PM      START taking these medications    Details   cephALEXin (Keflex) 500 mg capsule Take 1 Cap by mouth four (4) times daily for 7 days. , Print, Disp-28 Cap, R-0      naproxen (Naprosyn) 500 mg tablet Take 1 Tab by mouth two (2) times daily (with meals) for 10 days. , Print, Disp-20 Tab, R-0      acetaminophen (TYLENOL) 325 mg tablet Take 2 Tabs by mouth every six (6) hours as needed for Pain., Print, Disp-20 Tab, R-0         CONTINUE these medications which have NOT CHANGED    Details   indomethacin (INDOCIN) 25 mg capsule Take 2 Caps by mouth three (3) times daily. With food, Print, Disp-60 Cap, R-0      sodium chloride (NASAL MIST) 0.9 % spra 1 Spray by Nasal route as needed (congestion). , Print, Disp-126 mL, R-0      atenolol (TENORMIN) 25 mg tablet Take 1 Tab by mouth daily. , Print, Disp-30 Tab, R-0 Jorgito Herbert PA-C    Dictation disclaimer:  Please note that this dictation was completed with [employer], the computer voice recognition software. Quite often unanticipated grammatical, syntax, homophones, and other interpretive errors are inadvertently transcribed by the computer software. Please disregard these errors. Please excuse any errors that have escaped final proofreading.

## 2020-03-29 NOTE — ED NOTES
Pt repaired laceration coated with bacitracin, covered with telfa pad and bulky small guaze. Suture care education provided. I have reviewed discharge instructions with the patient. The patient verbalized understanding.   Patient armband removed and shredded

## 2020-11-18 ENCOUNTER — HOSPITAL ENCOUNTER (EMERGENCY)
Age: 60
Discharge: HOME | End: 2020-11-18
Attending: EMERGENCY MEDICINE

## 2020-11-18 VITALS
RESPIRATION RATE: 18 BRPM | DIASTOLIC BLOOD PRESSURE: 96 MMHG | SYSTOLIC BLOOD PRESSURE: 144 MMHG | TEMPERATURE: 97 F | HEART RATE: 63 BPM | OXYGEN SATURATION: 100 %

## 2020-11-18 DIAGNOSIS — Z91.89 AT INCREASED RISK OF EXPOSURE TO COVID-19 VIRUS: Primary | ICD-10-CM

## 2020-11-18 PROCEDURE — 99282 EMERGENCY DEPT VISIT SF MDM: CPT

## 2020-11-18 PROCEDURE — 87635 SARS-COV-2 COVID-19 AMP PRB: CPT

## 2020-11-18 NOTE — ED PROVIDER NOTES
EMERGENCY DEPARTMENT HISTORY AND PHYSICAL EXAM    Date: 11/18/2020  Patient Name: Claudia Marrufo    History of Presenting Illness     Chief Complaint   Patient presents with    Concern For COVID-19 (Coronavirus)         History Provided By: patient        Additional History (Context): Claudia Marrufo is a 62yo M here with c/o concern for Covid. Patient states he works around someone who he thinks may have Covid and is concerned as he goes home and lives with his elderly parents. Patient denies cough, shortness of breath, fever or any symptoms at this time. States he feels well. Does not smoke does not drink. PCP: Rick Barrett MD    Current Outpatient Medications   Medication Sig Dispense Refill    acetaminophen (TYLENOL) 325 mg tablet Take 2 Tabs by mouth every six (6) hours as needed for Pain. 20 Tab 0    indomethacin (INDOCIN) 25 mg capsule Take 2 Caps by mouth three (3) times daily. With food 60 Cap 0    sodium chloride (NASAL MIST) 0.9 % spra 1 Spray by Nasal route as needed (congestion). 126 mL 0    atenolol (TENORMIN) 25 mg tablet Take 1 Tab by mouth daily. 30 Tab 0       Past History     Past Medical History:  Past Medical History:   Diagnosis Date    HTN (hypertension)        Past Surgical History:  No past surgical history on file. Family History:  No family history on file. Social History:  Social History     Tobacco Use    Smoking status: Current Every Day Smoker    Smokeless tobacco: Never Used   Substance Use Topics    Alcohol use: Yes    Drug use: Never       Allergies:  No Known Allergies      Review of Systems       Review of Systems   Constitutional: Negative for chills and fever. HENT: Negative for nasal congestion, sore throat, rhinorrhea  Eyes: Negative. Respiratory: pos for cough and negative for shortness of breath. Cardiovascular: Negative for chest pain and palpitations.    Gastrointestinal: Negative for abdominal pain, constipation, diarrhea, nausea and vomiting. Genitourinary: Negative. Negative for difficulty urinating and flank pain. Musculoskeletal: Negative for back pain. Negative for gait problem and neck pain. Skin: Negative. Allergic/Immunologic: Negative. Neurological: Negative for dizziness, weakness, numbness and headaches. Psychiatric/Behavioral: Negative. All other systems reviewed and are negative. All Other Systems Negative  Physical Exam     Vitals:    11/18/20 1544 11/18/20 1546 11/18/20 1548   BP:  (!) 144/96    Pulse:   63   Resp: 18     Temp: 97 °F (36.1 °C)     SpO2: 100%       Physical Exam  Vitals signs and nursing note reviewed. Constitutional:       General: He is not in acute distress. Appearance: He is well-developed. He is not diaphoretic. HENT:      Head: Normocephalic and atraumatic. Nose: Nose normal.   Eyes:      Conjunctiva/sclera: Conjunctivae normal.      Pupils: Pupils are equal, round, and reactive to light. Neck:      Musculoskeletal: Normal range of motion and neck supple. Cardiovascular:      Rate and Rhythm: Normal rate and regular rhythm. Pulmonary:      Effort: Pulmonary effort is normal. No respiratory distress. Breath sounds: Normal breath sounds. Abdominal:      Palpations: Abdomen is soft. Musculoskeletal: Normal range of motion. Skin:     General: Skin is warm. Findings: No rash. Neurological:      Mental Status: He is alert and oriented to person, place, and time. Cranial Nerves: No cranial nerve deficit. Coordination: Coordination normal.   Psychiatric:         Behavior: Behavior normal.           Diagnostic Study Results     Labs -   No results found for this or any previous visit (from the past 12 hour(s)). Radiologic Studies -   No orders to display     CT Results  (Last 48 hours)    None        CXR Results  (Last 48 hours)    None            Medical Decision Making   I am the first provider for this patient.     I reviewed the vital signs, available nursing notes, past medical history, past surgical history, family history and social history. Vital Signs-Reviewed the patient's vital signs. Records Reviewed: Nursing notes, old medical records and any previous labs, imaging, visits, consultations pertinent to patient care    Procedures:  Procedures      ED Course: Progress Notes, Reevaluation, and Consults:      Provider Notes (Medical Decision Making):   Pt here for covid testing, exposure at work. Pt asx at this time. MED RECONCILIATION:  No current facility-administered medications for this encounter. Current Outpatient Medications   Medication Sig    acetaminophen (TYLENOL) 325 mg tablet Take 2 Tabs by mouth every six (6) hours as needed for Pain.  indomethacin (INDOCIN) 25 mg capsule Take 2 Caps by mouth three (3) times daily. With food    sodium chloride (NASAL MIST) 0.9 % spra 1 Spray by Nasal route as needed (congestion).  atenolol (TENORMIN) 25 mg tablet Take 1 Tab by mouth daily. Disposition:  home    DISCHARGE NOTE:     Pt has been reexamined. Patient has no new complaints, changes, or physical findings. Care plan outlined and precautions discussed. Discussed proper way to take medications. Discussed treatment plan, return precautions, symptomatic relief, and expected time to improvement. All questions answered. Patient is stable for discharge and outpatient management. Patient is ready to go home. Follow-up Information     Follow up With Specialties Details Why [address] [facility] EMERGENCY DEPT Emergency Medicine   [address]  [phone]          Discharge Medication List as of 11/18/2020  4:58 PM                Diagnosis     Clinical Impression: No diagnosis found. Dictation disclaimer:  Please note that this dictation was completed with [employer], the [employer] voice recognition software.   Quite often unanticipated grammatical, syntax, homophones, and other interpretive errors are inadvertently transcribed by the computer software. Please disregard these errors. Please excuse any errors that have escaped final proofreading.

## 2020-11-18 NOTE — ED TRIAGE NOTES
Patient states he was exposed to a friend who had a positive covid test last week. States he thinks he might have had a fever but doesn't know. States he lives with his elderly parents so he wants to know if he has covid or not.

## 2020-11-19 ENCOUNTER — PATIENT OUTREACH (OUTPATIENT)
Dept: CASE MANAGEMENT | Age: 60
End: 2020-11-19

## 2020-11-19 NOTE — ACP (ADVANCE CARE PLANNING)
currently not on file; education provided  and Patient encoraged to discuss ACP with Dr. Tiburcio Klinefelter at f/u appt

## 2020-11-19 NOTE — PROGRESS NOTES
Patient contacted regarding COVID-19 exposure. Discussed COVID-19 related testing which was pending at this time. Test results were pending. Patient informed of results, if available? n/a. Outreach made within 2 business days of discharge: Yes    Care Transition Nurse/ Ambulatory Care Manager/ LPN Care Coordinator contacted the patient by telephone to perform post discharge assessment. Verified name and  with patient as identifiers. Provided introduction to self, and explanation of the CTN/ACM/LPN role, and reason for call due to risk factors for infection and/or exposure to COVID-19. Symptoms reviewed with patient who verbalized the following symptoms: cough. Due to No worsening symptoms encounter was not routed to provider for escalation. Discussed follow-up appointments. If no appointment was previously scheduled, appointment scheduling offered: yes  [facility](s): No future appointments. Non-[facility] follow up appointment(s): n/a patient states he will schedule f/u appointment with Dr. Danica Jackson:   Does patient have an Advance Directive: currently not on file; education provided  and Patient encoraged to discuss ACP with Dr. Lisa Rodriguez at f/u appt    Patient has following risk factors of: Covid Exposure. CTN/ACM/LPN reviewed discharge instructions, medical action plan and red flags such as increased shortness of breath, increasing fever and signs of decompensation with patient who verbalized understanding. Discussed exposure protocols and quarantine with CDC Guidelines What to do if you are sick with coronavirus disease 2019.  Patient was given an opportunity for questions and concerns. The patient agrees to contact the [facility] exposure line [phone], local [facility] department [address]  ([phone]) and PCP office for questions related to their healthcare. CTN/ACM provided contact information for future needs.     Reviewed and educated patient on any new and changed medications related to discharge diagnosis. Patient/family/caregiver given information for [employer] and agrees to enroll yes  Patient's preferred e-mail:  [email or web address]  Patient's preferred phone number: [phone]  Based on Loop alert triggers, patient will be contacted by nurse care manager for worsening symptoms. Plan for follow-up call in 3-5 days based on severity of symptoms and risk factors.

## 2020-11-23 LAB — SARS-COV-2, COV2NT: DETECTED

## 2020-11-25 ENCOUNTER — PATIENT OUTREACH (OUTPATIENT)
Dept: CASE MANAGEMENT | Age: 60
End: 2020-11-25

## 2020-11-25 NOTE — PROGRESS NOTES
Pt called regarding +covid. Number given unable to receive calls or VM  Will continue to call.    Dion Bennett PA-C

## 2020-11-25 NOTE — PROGRESS NOTES
Patient contacted regarding COVID-19 diagnosis. Made two call attempts. No answer. Left message introducing self, role and reason for call. Requested return call. Contact information provided. Episode of care resolved.

## 2020-11-28 ENCOUNTER — PATIENT OUTREACH (OUTPATIENT)
Dept: CASE MANAGEMENT | Age: 60
End: 2020-11-28

## 2020-11-28 NOTE — PROGRESS NOTES
Patient contacted regarding COVID-19 risk and screening. Discussed COVID-19 related testing which was available at this time. Test results were positive. Patient informed of results, if available? n/a     Care Transition Nurse/ Ambulatory Care Manager/ LPN Care Coordinator contacted the patient by telephone to perform follow-up assessment. Verified name and  with patient as identifiers. Patient has following risk factors of: Covid positive. Symptoms reviewed with patient who verbalized the following symptoms: cough. Due to no new or worsening symptoms encounter was not routed to provider for escalation. Education provided regarding infection prevention, and signs and symptoms of COVID-19 and when to seek medical attention with patient who verbalized understanding. Discussed exposure protocols and quarantine from [address] you at higher risk for severe illness  and given an opportunity for questions and concerns. The patient agrees to contact the COVID-19 hotline [phone] or PCP office for questions related to their healthcare. CTN/ACM/LPN provided contact information for future reference. From CDC: Are you at higher risk for severe illness?  Wash your hands often.  Avoid close contact (6 feet, which is about two arm lengths) with people who are sick.  Put distance between yourself and other people if COVID-19 is spreading in your community.  Clean and disinfect frequently touched surfaces.  Avoid all cruise travel and non-essential air travel.  Call your healthcare professional if you have concerns about COVID-19 and your underlying condition or if you are sick. For more information on steps you can take to protect yourself, see CDC's How to [place] for follow-up call in 3-5 days based on severity of symptoms and risk factors.

## 2020-12-02 ENCOUNTER — PATIENT OUTREACH (OUTPATIENT)
Dept: CASE MANAGEMENT | Age: 60
End: 2020-12-02

## 2020-12-02 NOTE — PROGRESS NOTES
Yellow alert noted in remote COVID-19 Loop Symptom Monitoring Program. Messaged patient to notify Chad Del Valle if symptoms have worsened since yesterday.       Patient tested positive for COVID-10

## 2020-12-05 ENCOUNTER — PATIENT OUTREACH (OUTPATIENT)
Dept: CASE MANAGEMENT | Age: 60
End: 2020-12-05

## 2020-12-05 NOTE — PROGRESS NOTES
Yellow alert noted in remote symptom monitoring program. Messaged patient to notify Alondra Pain if symptoms have worsened since yesterday or if they would like to have a nurse reach out.

## 2020-12-06 NOTE — PROGRESS NOTES
Comment alert noted in remote symptom monitoring program. Messaged patient to notify Hernandez Fritter if symptoms have worsened since yesterday or if they would like to have a nurse reach out.